# Patient Record
Sex: MALE | Race: WHITE | NOT HISPANIC OR LATINO | Employment: FULL TIME | URBAN - METROPOLITAN AREA
[De-identification: names, ages, dates, MRNs, and addresses within clinical notes are randomized per-mention and may not be internally consistent; named-entity substitution may affect disease eponyms.]

---

## 2022-11-25 ENCOUNTER — APPOINTMENT (OUTPATIENT)
Dept: RADIOLOGY | Facility: MEDICAL CENTER | Age: 57
DRG: 153 | End: 2022-11-25
Payer: COMMERCIAL

## 2022-11-25 ENCOUNTER — APPOINTMENT (OUTPATIENT)
Dept: RADIOLOGY | Facility: MEDICAL CENTER | Age: 57
DRG: 153 | End: 2022-11-25
Attending: STUDENT IN AN ORGANIZED HEALTH CARE EDUCATION/TRAINING PROGRAM
Payer: COMMERCIAL

## 2022-11-25 ENCOUNTER — HOSPITAL ENCOUNTER (INPATIENT)
Facility: MEDICAL CENTER | Age: 57
LOS: 1 days | DRG: 153 | End: 2022-11-26
Attending: STUDENT IN AN ORGANIZED HEALTH CARE EDUCATION/TRAINING PROGRAM | Admitting: STUDENT IN AN ORGANIZED HEALTH CARE EDUCATION/TRAINING PROGRAM
Payer: COMMERCIAL

## 2022-11-25 DIAGNOSIS — I10 PRIMARY HYPERTENSION: ICD-10-CM

## 2022-11-25 DIAGNOSIS — J02.0 STREP PHARYNGITIS: ICD-10-CM

## 2022-11-25 DIAGNOSIS — J98.8 NARROWING OF AIRWAY: ICD-10-CM

## 2022-11-25 DIAGNOSIS — Z72.0 TOBACCO ABUSE: ICD-10-CM

## 2022-11-25 DIAGNOSIS — R09.02 HYPOXEMIA: ICD-10-CM

## 2022-11-25 DIAGNOSIS — G47.33 OSA (OBSTRUCTIVE SLEEP APNEA): ICD-10-CM

## 2022-11-25 PROBLEM — E66.01 MORBID OBESITY (HCC): Status: ACTIVE | Noted: 2022-11-25

## 2022-11-25 PROBLEM — J44.9 COPD (CHRONIC OBSTRUCTIVE PULMONARY DISEASE) (HCC): Status: ACTIVE | Noted: 2022-11-25

## 2022-11-25 PROBLEM — B95.0 GROUP A STREPTOCOCCAL INFECTION: Status: ACTIVE | Noted: 2022-11-25

## 2022-11-25 PROBLEM — J03.90 TONSILLITIS: Status: ACTIVE | Noted: 2022-11-25

## 2022-11-25 LAB
ALBUMIN SERPL BCP-MCNC: 4.3 G/DL (ref 3.2–4.9)
ALBUMIN/GLOB SERPL: 1.4 G/DL
ALP SERPL-CCNC: 67 U/L (ref 30–99)
ALT SERPL-CCNC: 16 U/L (ref 2–50)
ANION GAP SERPL CALC-SCNC: 13 MMOL/L (ref 7–16)
AST SERPL-CCNC: 14 U/L (ref 12–45)
BASOPHILS # BLD AUTO: 0.5 % (ref 0–1.8)
BASOPHILS # BLD: 0.1 K/UL (ref 0–0.12)
BILIRUB SERPL-MCNC: 0.7 MG/DL (ref 0.1–1.5)
BUN SERPL-MCNC: 17 MG/DL (ref 8–22)
CALCIUM SERPL-MCNC: 9 MG/DL (ref 8.5–10.5)
CHLORIDE SERPL-SCNC: 101 MMOL/L (ref 96–112)
CO2 SERPL-SCNC: 23 MMOL/L (ref 20–33)
CREAT SERPL-MCNC: 0.85 MG/DL (ref 0.5–1.4)
EKG IMPRESSION: NORMAL
EOSINOPHIL # BLD AUTO: 0.05 K/UL (ref 0–0.51)
EOSINOPHIL NFR BLD: 0.3 % (ref 0–6.9)
ERYTHROCYTE [DISTWIDTH] IN BLOOD BY AUTOMATED COUNT: 44.1 FL (ref 35.9–50)
FLUAV RNA SPEC QL NAA+PROBE: NEGATIVE
FLUBV RNA SPEC QL NAA+PROBE: NEGATIVE
GFR SERPLBLD CREATININE-BSD FMLA CKD-EPI: 100 ML/MIN/1.73 M 2
GLOBULIN SER CALC-MCNC: 3.1 G/DL (ref 1.9–3.5)
GLUCOSE SERPL-MCNC: 152 MG/DL (ref 65–99)
HCT VFR BLD AUTO: 44.8 % (ref 42–52)
HGB BLD-MCNC: 14.7 G/DL (ref 14–18)
IMM GRANULOCYTES # BLD AUTO: 0.12 K/UL (ref 0–0.11)
IMM GRANULOCYTES NFR BLD AUTO: 0.6 % (ref 0–0.9)
LYMPHOCYTES # BLD AUTO: 1.2 K/UL (ref 1–4.8)
LYMPHOCYTES NFR BLD: 6.4 % (ref 22–41)
MCH RBC QN AUTO: 30.5 PG (ref 27–33)
MCHC RBC AUTO-ENTMCNC: 32.8 G/DL (ref 33.7–35.3)
MCV RBC AUTO: 92.9 FL (ref 81.4–97.8)
MONOCYTES # BLD AUTO: 1.4 K/UL (ref 0–0.85)
MONOCYTES NFR BLD AUTO: 7.4 % (ref 0–13.4)
NEUTROPHILS # BLD AUTO: 15.98 K/UL (ref 1.82–7.42)
NEUTROPHILS NFR BLD: 84.8 % (ref 44–72)
NRBC # BLD AUTO: 0 K/UL
NRBC BLD-RTO: 0 /100 WBC
PLATELET # BLD AUTO: 269 K/UL (ref 164–446)
PMV BLD AUTO: 10.4 FL (ref 9–12.9)
POTASSIUM SERPL-SCNC: 3.8 MMOL/L (ref 3.6–5.5)
PROT SERPL-MCNC: 7.4 G/DL (ref 6–8.2)
RBC # BLD AUTO: 4.82 M/UL (ref 4.7–6.1)
RSV RNA SPEC QL NAA+PROBE: NEGATIVE
S PYO DNA SPEC NAA+PROBE: DETECTED
SARS-COV-2 RNA RESP QL NAA+PROBE: NOTDETECTED
SODIUM SERPL-SCNC: 137 MMOL/L (ref 135–145)
SPECIMEN SOURCE: NORMAL
WBC # BLD AUTO: 18.9 K/UL (ref 4.8–10.8)

## 2022-11-25 PROCEDURE — 700117 HCHG RX CONTRAST REV CODE 255: Performed by: STUDENT IN AN ORGANIZED HEALTH CARE EDUCATION/TRAINING PROGRAM

## 2022-11-25 PROCEDURE — 85025 COMPLETE CBC W/AUTO DIFF WBC: CPT

## 2022-11-25 PROCEDURE — 93005 ELECTROCARDIOGRAM TRACING: CPT

## 2022-11-25 PROCEDURE — A9270 NON-COVERED ITEM OR SERVICE: HCPCS | Performed by: STUDENT IN AN ORGANIZED HEALTH CARE EDUCATION/TRAINING PROGRAM

## 2022-11-25 PROCEDURE — 94660 CPAP INITIATION&MGMT: CPT

## 2022-11-25 PROCEDURE — 770000 HCHG ROOM/CARE - INTERMEDIATE ICU *

## 2022-11-25 PROCEDURE — 96365 THER/PROPH/DIAG IV INF INIT: CPT

## 2022-11-25 PROCEDURE — 700105 HCHG RX REV CODE 258: Performed by: STUDENT IN AN ORGANIZED HEALTH CARE EDUCATION/TRAINING PROGRAM

## 2022-11-25 PROCEDURE — 70491 CT SOFT TISSUE NECK W/DYE: CPT

## 2022-11-25 PROCEDURE — 93005 ELECTROCARDIOGRAM TRACING: CPT | Performed by: STUDENT IN AN ORGANIZED HEALTH CARE EDUCATION/TRAINING PROGRAM

## 2022-11-25 PROCEDURE — 99222 1ST HOSP IP/OBS MODERATE 55: CPT | Performed by: STUDENT IN AN ORGANIZED HEALTH CARE EDUCATION/TRAINING PROGRAM

## 2022-11-25 PROCEDURE — 700111 HCHG RX REV CODE 636 W/ 250 OVERRIDE (IP): Performed by: STUDENT IN AN ORGANIZED HEALTH CARE EDUCATION/TRAINING PROGRAM

## 2022-11-25 PROCEDURE — C9803 HOPD COVID-19 SPEC COLLECT: HCPCS | Performed by: STUDENT IN AN ORGANIZED HEALTH CARE EDUCATION/TRAINING PROGRAM

## 2022-11-25 PROCEDURE — 0241U HCHG SARS-COV-2 COVID-19 NFCT DS RESP RNA 4 TRGT MIC: CPT

## 2022-11-25 PROCEDURE — 92610 EVALUATE SWALLOWING FUNCTION: CPT

## 2022-11-25 PROCEDURE — 700102 HCHG RX REV CODE 250 W/ 637 OVERRIDE(OP): Performed by: INTERNAL MEDICINE

## 2022-11-25 PROCEDURE — 700102 HCHG RX REV CODE 250 W/ 637 OVERRIDE(OP): Performed by: STUDENT IN AN ORGANIZED HEALTH CARE EDUCATION/TRAINING PROGRAM

## 2022-11-25 PROCEDURE — 700101 HCHG RX REV CODE 250: Performed by: STUDENT IN AN ORGANIZED HEALTH CARE EDUCATION/TRAINING PROGRAM

## 2022-11-25 PROCEDURE — 80053 COMPREHEN METABOLIC PANEL: CPT

## 2022-11-25 PROCEDURE — 94640 AIRWAY INHALATION TREATMENT: CPT

## 2022-11-25 PROCEDURE — 36415 COLL VENOUS BLD VENIPUNCTURE: CPT

## 2022-11-25 PROCEDURE — 96375 TX/PRO/DX INJ NEW DRUG ADDON: CPT

## 2022-11-25 PROCEDURE — A9270 NON-COVERED ITEM OR SERVICE: HCPCS | Performed by: INTERNAL MEDICINE

## 2022-11-25 PROCEDURE — 99285 EMERGENCY DEPT VISIT HI MDM: CPT

## 2022-11-25 PROCEDURE — 71045 X-RAY EXAM CHEST 1 VIEW: CPT

## 2022-11-25 PROCEDURE — 87651 STREP A DNA AMP PROBE: CPT

## 2022-11-25 RX ORDER — DEXAMETHASONE SODIUM PHOSPHATE 4 MG/ML
6 INJECTION, SOLUTION INTRA-ARTICULAR; INTRALESIONAL; INTRAMUSCULAR; INTRAVENOUS; SOFT TISSUE ONCE
Status: COMPLETED | OUTPATIENT
Start: 2022-11-25 | End: 2022-11-25

## 2022-11-25 RX ORDER — NICOTINE 21 MG/24HR
21 PATCH, TRANSDERMAL 24 HOURS TRANSDERMAL
Status: DISCONTINUED | OUTPATIENT
Start: 2022-11-25 | End: 2022-11-26 | Stop reason: HOSPADM

## 2022-11-25 RX ORDER — AZITHROMYCIN 500 MG/5ML
500 INJECTION, POWDER, LYOPHILIZED, FOR SOLUTION INTRAVENOUS ONCE
Status: COMPLETED | OUTPATIENT
Start: 2022-11-25 | End: 2022-11-25

## 2022-11-25 RX ORDER — OXYMETAZOLINE HYDROCHLORIDE 0.05 G/100ML
2 SPRAY NASAL ONCE
Status: COMPLETED | OUTPATIENT
Start: 2022-11-25 | End: 2022-11-25

## 2022-11-25 RX ORDER — AZITHROMYCIN 250 MG/1
500 TABLET, FILM COATED ORAL DAILY
Status: DISCONTINUED | OUTPATIENT
Start: 2022-11-26 | End: 2022-11-26 | Stop reason: HOSPADM

## 2022-11-25 RX ORDER — PNV NO.95/FERROUS FUM/FOLIC AC 28MG-0.8MG
325 TABLET ORAL
COMMUNITY

## 2022-11-25 RX ORDER — ALBUTEROL SULFATE 2.5 MG/3ML
2.5 SOLUTION RESPIRATORY (INHALATION) ONCE
Status: DISCONTINUED | OUTPATIENT
Start: 2022-11-25 | End: 2022-11-25

## 2022-11-25 RX ORDER — VALSARTAN 80 MG/1
80 TABLET ORAL DAILY
Status: DISCONTINUED | OUTPATIENT
Start: 2022-11-25 | End: 2022-11-26 | Stop reason: HOSPADM

## 2022-11-25 RX ORDER — AZITHROMYCIN 500 MG/5ML
500 INJECTION, POWDER, LYOPHILIZED, FOR SOLUTION INTRAVENOUS EVERY 24 HOURS
Status: DISCONTINUED | OUTPATIENT
Start: 2022-11-26 | End: 2022-11-25

## 2022-11-25 RX ORDER — VALSARTAN 80 MG/1
80 TABLET ORAL DAILY
Status: ON HOLD | COMMUNITY
End: 2022-11-26 | Stop reason: SDUPTHER

## 2022-11-25 RX ORDER — ENOXAPARIN SODIUM 100 MG/ML
40 INJECTION SUBCUTANEOUS DAILY
Status: DISCONTINUED | OUTPATIENT
Start: 2022-11-25 | End: 2022-11-26 | Stop reason: HOSPADM

## 2022-11-25 RX ORDER — METHYLPREDNISOLONE SODIUM SUCCINATE 40 MG/ML
40 INJECTION, POWDER, LYOPHILIZED, FOR SOLUTION INTRAMUSCULAR; INTRAVENOUS EVERY 8 HOURS
Status: DISCONTINUED | OUTPATIENT
Start: 2022-11-25 | End: 2022-11-26

## 2022-11-25 RX ORDER — KETOROLAC TROMETHAMINE 30 MG/ML
15 INJECTION, SOLUTION INTRAMUSCULAR; INTRAVENOUS ONCE
Status: COMPLETED | OUTPATIENT
Start: 2022-11-25 | End: 2022-11-25

## 2022-11-25 RX ADMIN — PREDNISONE 50 MG: 10 TABLET ORAL at 01:36

## 2022-11-25 RX ADMIN — NICOTINE TRANSDERMAL SYSTEM 21 MG: 21 PATCH, EXTENDED RELEASE TRANSDERMAL at 15:15

## 2022-11-25 RX ADMIN — VALSARTAN 80 MG: 80 TABLET, FILM COATED ORAL at 17:09

## 2022-11-25 RX ADMIN — ALBUTEROL SULFATE 2.5 MG: 2.5 SOLUTION RESPIRATORY (INHALATION) at 01:36

## 2022-11-25 RX ADMIN — DEXAMETHASONE SODIUM PHOSPHATE 6 MG: 4 INJECTION, SOLUTION INTRA-ARTICULAR; INTRALESIONAL; INTRAMUSCULAR; INTRAVENOUS; SOFT TISSUE at 02:16

## 2022-11-25 RX ADMIN — KETOROLAC TROMETHAMINE 15 MG: 30 INJECTION, SOLUTION INTRAMUSCULAR at 05:23

## 2022-11-25 RX ADMIN — AZITHROMYCIN MONOHYDRATE 500 MG: 500 INJECTION, POWDER, LYOPHILIZED, FOR SOLUTION INTRAVENOUS at 03:26

## 2022-11-25 RX ADMIN — OXYMETAZOLINE HCL 2 SPRAY: 0.05 SPRAY NASAL at 01:00

## 2022-11-25 RX ADMIN — ENOXAPARIN SODIUM 40 MG: 40 INJECTION SUBCUTANEOUS at 05:33

## 2022-11-25 RX ADMIN — METHYLPREDNISOLONE SODIUM SUCCINATE 40 MG: 40 INJECTION, POWDER, FOR SOLUTION INTRAMUSCULAR; INTRAVENOUS at 09:04

## 2022-11-25 RX ADMIN — IOHEXOL 80 ML: 350 INJECTION, SOLUTION INTRAVENOUS at 02:15

## 2022-11-25 RX ADMIN — METHYLPREDNISOLONE SODIUM SUCCINATE 40 MG: 40 INJECTION, POWDER, FOR SOLUTION INTRAMUSCULAR; INTRAVENOUS at 17:09

## 2022-11-25 ASSESSMENT — LIFESTYLE VARIABLES
HOW MANY TIMES IN THE PAST YEAR HAVE YOU HAD 5 OR MORE DRINKS IN A DAY: 12
EVER HAD A DRINK FIRST THING IN THE MORNING TO STEADY YOUR NERVES TO GET RID OF A HANGOVER: NO
EVER FELT BAD OR GUILTY ABOUT YOUR DRINKING: NO
TOTAL SCORE: 0
AVERAGE NUMBER OF DAYS PER WEEK YOU HAVE A DRINK CONTAINING ALCOHOL: 1
DOES PATIENT WANT TO STOP DRINKING: NO
HAVE YOU EVER FELT YOU SHOULD CUT DOWN ON YOUR DRINKING: NO
ALCOHOL_USE: YES
CONSUMPTION TOTAL: POSITIVE
HAVE PEOPLE ANNOYED YOU BY CRITICIZING YOUR DRINKING: NO
ON A TYPICAL DAY WHEN YOU DRINK ALCOHOL HOW MANY DRINKS DO YOU HAVE: 2

## 2022-11-25 ASSESSMENT — PAIN DESCRIPTION - PAIN TYPE
TYPE: ACUTE PAIN

## 2022-11-25 ASSESSMENT — ENCOUNTER SYMPTOMS
HEADACHES: 0
VOMITING: 0
SHORTNESS OF BREATH: 1
NAUSEA: 0
CHILLS: 0
FALLS: 0
FEVER: 1
LOSS OF CONSCIOUSNESS: 0
COUGH: 1
SORE THROAT: 0
BLURRED VISION: 0
ABDOMINAL PAIN: 0
NECK PAIN: 0
DOUBLE VISION: 0
EYES NEGATIVE: 1

## 2022-11-25 ASSESSMENT — PATIENT HEALTH QUESTIONNAIRE - PHQ9
1. LITTLE INTEREST OR PLEASURE IN DOING THINGS: NOT AT ALL
2. FEELING DOWN, DEPRESSED, IRRITABLE, OR HOPELESS: NOT AT ALL
SUM OF ALL RESPONSES TO PHQ9 QUESTIONS 1 AND 2: 0

## 2022-11-25 NOTE — H&P
Hospital Medicine History & Physical Note    Date of Service  11/25/2022    Primary Care Physician  No primary care provider on file.    Consultants  ENT  Intensivist     Code Status  Full Code    Chief Complaint  Chief Complaint   Patient presents with    Shortness of Breath       History of Presenting Illness  Tone Wolf is a 57 y.o. male who presented 11/25/2022 with difficulties breathing, sore throat.     Mildly tachypneic in ED. CT neck shows significant edema and swelling is noted around the airway causing narrowing at level of pharynx. Induration and enhancement noted posterior to pharynx could indicate soft tissue infection. GAS +.      I discussed the plan of care with patient.    Review of Systems  Review of Systems   Constitutional:  Positive for malaise/fatigue.   HENT:  Negative for hearing loss.    Eyes: Negative.    Skin: Negative.    All other systems reviewed and are negative.    Past Medical History  No pertinent medical history     Surgical History  No pertinent     Family History  family history is not on file.   Family history reviewed with patient. There is no family history that is pertinent to the chief complaint.     Social History   reports that he has been smoking cigarettes. He has been smoking an average of .75 packs per day. He has never used smokeless tobacco. He reports current alcohol use. He reports that he does not currently use drugs.    Allergies  Allergies   Allergen Reactions    Penicillins Anaphylaxis     Required epi-pen    Reglan [Metoclopramide Hcl]        Medications  None       Physical Exam  Temp:  [37.3 °C (99.2 °F)] 37.3 °C (99.2 °F)  Pulse:  [95] 95  Resp:  [22-26] 22  BP: (126)/(93) 126/93  SpO2:  [92 %] 92 %  Blood Pressure: (!) 126/93   Temperature: 37.3 °C (99.2 °F)   Pulse: 95   Respiration: (!) 22   Pulse Oximetry: 92 %       Physical Exam  Constitutional:       Appearance: Normal appearance. He is obese.   HENT:      Head: Normocephalic.      Nose: Nose  normal.      Mouth/Throat:      Mouth: Mucous membranes are moist.   Eyes:      Pupils: Pupils are equal, round, and reactive to light.   Neck:      Comments: Bilateral tonsils inflammed, swollen and erythematous  Cardiovascular:      Rate and Rhythm: Normal rate and regular rhythm.   Pulmonary:      Effort: Pulmonary effort is normal.      Breath sounds: Normal breath sounds.   Abdominal:      General: Abdomen is flat. Bowel sounds are normal.      Palpations: Abdomen is soft.   Musculoskeletal:         General: Normal range of motion.      Cervical back: Neck supple.   Skin:     General: Skin is warm.   Neurological:      General: No focal deficit present.      Mental Status: He is alert and oriented to person, place, and time. Mental status is at baseline.   Psychiatric:         Mood and Affect: Mood normal.         Behavior: Behavior normal.         Thought Content: Thought content normal.         Judgment: Judgment normal.       Laboratory:      Recent Labs     11/25/22  0057   SODIUM 137   POTASSIUM 3.8   CHLORIDE 101   CO2 23   GLUCOSE 152*   BUN 17   CREATININE 0.85   CALCIUM 9.0     Recent Labs     11/25/22  0057   ALTSGPT 16   ASTSGOT 14   ALKPHOSPHAT 67   TBILIRUBIN 0.7   GLUCOSE 152*         No results for input(s): NTPROBNP in the last 72 hours.      No results for input(s): TROPONINT in the last 72 hours.    Imaging:  CT-SOFT TISSUE NECK WITH   Final Result      1.  Significant edema and swelling is noted around the airway causing narrowing at the level of the pharynx. Findings are suspicious for tonsillitis.      2.  Additional induration and enhancement is noted posterior to the pharynx which could indicate soft tissue infection. No well-defined fluid collection that would suggest abscess is identified.      3.  Bilateral adenopathy is identified which is likely due to infection.      4.  Mucosal thickening in the right maxillary sinus.      DX-CHEST-PORTABLE (1 VIEW)   Final Result         No acute  cardiac or pulmonary abnormality is identified.          X-Ray:  I have personally reviewed the images and compared with prior images.    Assessment/Plan:  Justification for Admission Status  I anticipate this patient will require at least two midnights for appropriate medical management, necessitating inpatient admission because sore throat       * Group A streptococcal infection  Assessment & Plan  Azithromycin ( has PCN allergy)       COPD (chronic obstructive pulmonary disease) (formerly [facility])  Assessment & Plan  Not in exacerbation at this time  Oxygen prn    Morbid obesity (formerly [facility])  Assessment & Plan  Will need counseling when clinically appropriate   CPAP qhs    Tonsillitis  Assessment & Plan  CT neck show Significant edema and swelling is noted around the airway causing narrowing at the level of the pharynx  Solumedrol   ENT on board, no intervention for now      VTE prophylaxis: enoxaparin ppx

## 2022-11-25 NOTE — THERAPY
Speech Language Pathology   Clinical Swallow Evaluation     Patient Name: Tone Wolf  AGE:  57 y.o., SEX:  male  Medical Record #: [id number]  Today's Date: 11/25/2022     Precautions  Precautions: Fall Risk, Swallow Precautions ( See Comments)    Assessment    HPI:57 y.o. male who presented 11/25/2022 with difficulties breathing, sore throat. PMHx COPD, JETHRO hx CMHx Group A streptococcal infection, COPD, Tonsillitis. No SLP hx at [facility].    CXR 11/25:  No focal infiltrates or consolidations are identified in the lungs.  No pleural fluid collections are identified.    CT-Neck 11/25:  1.  Significant edema and swelling is noted around the airway causing narrowing at the level of the pharynx. Findings are suspicious for tonsillitis.  2.  Additional induration and enhancement is noted posterior to the pharynx which could indicate soft tissue infection. No well-defined fluid collection that would suggest abscess is identified.  3.  Bilateral adenopathy is identified which is likely due to infection.  4.  Mucosal thickening in the right maxillary sinus.      Level of Consciousness: Alert, Awake  Affect/Behavior: Appropriate, Calm, Cooperative  Follows Directives: Yes  Orientation: Oriented x 4  Hearing: Functional hearing  Vision: Functional vision      Prior Living Situation & Level of Function:  Patient lives w/SO. Pt denies any hx of dysphagia.    Oral Mechanism Evaluation  Facial Symmetry: Equal  Facial Sensation: Equal  Labial Observations: WFL  Lingual Observations: Midline  Dentition: Good  Comments:      Voice  Quality: Hoarse  Resonance: WFL  Intensity: Appropriate  Cough: WFL  Comments:      Current Method of Nutrition   NPO until cleared by speech pathology      Subjective  Patient received awake, SO at bedside. Pt cleared by RN for evaluation.       Assessment  Positioning: Phillips's (60-90 degrees)  Bolus Administration: Patient  Oxygen Requirements:  3L oxymask  Factor(s) Affecting Performance:  "None    Swallowing Trials  Ice: WFL  Thin Liquid (TN0): WFL  Liquidised (LQ3): WFL  Soft & Bite-Sized (SB6): WFL  Regular (RG7): WFL    Comments:  Adequate bolus acceptance, containment and transit. Prolonged but functional mastication with regular solids. Timely swallow initiation noted. Trace lingual residue with regular solids, cleared with thin liquid rinse. No cough/throat clearing noted. Vocal quality remained clear. Pt did not c/o difficulty swallowing or globus sensation. No overt s/sx of aspiration appreciated.       Clinical Impressions  Patient presents with functional swallow. Diet modification is not indicated. SLP will continue to follow to ensure diet tolerance.        Recommendations  1.  Regular solids and thin liquids  2.  Instrumentation: None indicated at this time  3.  Swallowing Instructions & Precautions:   Supervision: Independent  Positioning: Fully upright and midline during oral intake, Meals sitting upright in a chair, as tolerated  Medication: Whole with liquid, As tolerated  Strategies: Small bites/sips, Slow rate of intake  Oral Care: BID  4. HOLD PO with any difficulty or change in respiratory status      Plan    Recommend Speech Therapy 3 times per week until therapy goals are met for the following treatments:  Dysphagia Training and Patient / Family / Caregiver Education.    Discharge Recommendations: Anticipate that the patient will have no further speech therapy needs after discharge from the hospital       Objective       11/25/22 0913   Patient / Family Goals   Patient / Family Goal #1 \"Do you have steak?\"   Short Term Goals   Short Term Goal # 1 Patient will consume a diet of regular solids and thin liquids with no overt s/sx of aspiration     "

## 2022-11-25 NOTE — PROGRESS NOTES
Med Rec Complete per patient  Allergies Reviewed with patient  No antibiotics within the last 30 days  Patient's Preferred Pharmacy:  [facility] in [place], [place]    Patient mentioned that he uses an inhaler, but could not recall the name or any of the details of this medication.  The patient's home pharmacy did not have a prescription for an inhaler on file for him.

## 2022-11-25 NOTE — ED TRIAGE NOTES
Tone Wolf  57 y.o.  Chief Complaint   Patient presents with    Shortness of Breath     Ambulatory to [facility] for above, pt reporting increased SOB since traveling to [place] from [place] yesterday. Hx COPD, pt has increased work of breathing, is mouth breathing currently. Pt able to speak in full sentences. A&Ox4, VSS, EKG complete in triage, protocol placed.

## 2022-11-25 NOTE — CONSULTS
Consults    Patient was consulted for admission to telemetry, hospitalist requested ERP to discuss admission to IMC, due to swelling and  concerning for tonsillitis and patient with history of severe JETHRO (status post surgery has not used CPAP since). Patient with group A strep pharyngitis, with painful swallowing.   No change in voice, no trouble swallowing(except pain), no drooling, no stridor.  Respiratory rate in the low 20s satting 95% on 2 L.  Does desat to mid 80s when patient falls asleep, consistent with JETHRO.     CT neck:  1.  Significant edema and swelling is noted around the airway causing narrowing at the level of the pharynx. Findings are suspicious for tonsillitis.     2.  Additional induration and enhancement is noted posterior to the pharynx which could indicate soft tissue infection. No well-defined fluid collection that would suggest abscess is identified.    Upper airway swelling/tonsillitis/strep throat  ENT was contacted no intervention tonight.     Patient given antibiotics and Decadron.   Patient should be on CPAP at night at least until swelling improves.     Patient does have risk for decompensation do feel its appropriate to monitor airway closely in C, if patient begins having signs of worsening upper airway obstruction including drooling or voice changes respiratory distress increased work of breathing or stridor please contact intensivist team immediately for reevaluation and possible upgrade, ENT should also be notified of significant changes.

## 2022-11-25 NOTE — ED NOTES
Patient wheeled to red 2 from the lobby. Patient changed into gown and placed on the monitor. Bed locked and in lowest position. Call light within reach. Wife at bedside.

## 2022-11-25 NOTE — ED PROVIDER NOTES
ED Provider Note    Chief Complaint:   Shortness of breath    HPI:  Tone Wolf is a very pleasant 57-year-old male with past medical history of JETHRO who presents with shortness of breath, runny nose, cough starting yesterday.  Patient also had a fever to 102.5.  Patient reports history of smoking for several decades.  Patient endorses chills.  Patient reports recent travel from [place] and is concerned that the altitude may be affecting him.  Patient reports that he used to be on BiPAP daily but that he discontinued it after getting surgery.  Patient reports some discomfort with coughing.    Review of Systems:  Review of Systems   Constitutional:  Positive for fever. Negative for chills.   HENT:  Positive for congestion. Negative for sore throat.    Eyes:  Negative for blurred vision and double vision.   Respiratory:  Positive for cough and shortness of breath.    Cardiovascular:  Negative for chest pain and leg swelling.   Gastrointestinal:  Negative for abdominal pain, nausea and vomiting.   Genitourinary:  Negative for dysuria and hematuria.   Musculoskeletal:  Negative for falls and neck pain.   Skin:  Negative for itching and rash.   Neurological:  Negative for loss of consciousness and headaches.     Family History:  History reviewed. No pertinent family history.    Past Medical History:       Social History:  Social History     Tobacco Use    Smoking status: Every Day     Packs/day: 0.75     Types: Cigarettes    Smokeless tobacco: Never   Vaping Use    Vaping Use: Never used   Substance and Sexual Activity    Alcohol use: Yes     Comment: on sundays    Drug use: Not Currently    Sexual activity: Not on file       Surgical History:  patient denies any surgical history    Allergies:  Allergies   Allergen Reactions    Penicillins Anaphylaxis     Required epi-pen    Reglan [Metoclopramide Hcl]        Physical Exam:  Vital Signs: BP (!) 126/93   Pulse 95   Temp 37.3 °C (99.2 °F) (Temporal)   Resp (!) 22    "Ht 1.88 m (6' 2\")   Wt 122 kg (270 lb)   SpO2 92%   BMI 34.67 kg/m²   Physical Exam  Vitals and nursing note reviewed.   Constitutional:       Comments: Patient is lying in bed supine, pleasant, conversant, speaking in complete sentences   HENT:      Head: Normocephalic and atraumatic.      Comments: No sublingual elevation, no neck swelling, posterior oropharyngeal erythema and swelling is present     Nose: Congestion and rhinorrhea present.   Eyes:      Extraocular Movements: Extraocular movements intact.      Conjunctiva/sclera: Conjunctivae normal.      Pupils: Pupils are equal, round, and reactive to light.   Cardiovascular:      Pulses: Normal pulses.      Comments: HR 95  Pulmonary:      Effort: Pulmonary effort is normal. No respiratory distress.   Musculoskeletal:         General: No swelling. Normal range of motion.      Cervical back: Normal range of motion. No rigidity.   Skin:     General: Skin is warm and dry.      Capillary Refill: Capillary refill takes less than 2 seconds.   Neurological:      Mental Status: He is alert.       Medical records reviewed for continuity of care.     Results for orders placed or performed during the hospital encounter of 11/25/22   COV-2, FLU A/B, AND RSV BY PCR (2-4 HOURS CEPHEID): Collect NP swab in VTM    Specimen: Respirate   Result Value Ref Range    Influenza virus A RNA Negative Negative    Influenza virus B, PCR Negative Negative    RSV, PCR Negative Negative    SARS-CoV-2 by PCR NotDetected     SARS-CoV-2 Source NP Swab    Comp Metabolic Panel   Result Value Ref Range    Sodium 137 135 - 145 mmol/L    Potassium 3.8 3.6 - 5.5 mmol/L    Chloride 101 96 - 112 mmol/L    Co2 23 20 - 33 mmol/L    Anion Gap 13.0 7.0 - 16.0    Glucose 152 (H) 65 - 99 mg/dL    Bun 17 8 - 22 mg/dL    Creatinine 0.85 0.50 - 1.40 mg/dL    Calcium 9.0 8.5 - 10.5 mg/dL    AST(SGOT) 14 12 - 45 U/L    ALT(SGPT) 16 2 - 50 U/L    Alkaline Phosphatase 67 30 - 99 U/L    Total Bilirubin 0.7 0.1 - " 1.5 mg/dL    Albumin 4.3 3.2 - 4.9 g/dL    Total Protein 7.4 6.0 - 8.2 g/dL    Globulin 3.1 1.9 - 3.5 g/dL    A-G Ratio 1.4 g/dL   Group A Strep by PCR    Specimen: Throat   Result Value Ref Range    Group A Strep by PCR DETECTED (A) Not Detected   ESTIMATED GFR   Result Value Ref Range    GFR (CKD-EPI) 100 >60 mL/min/1.73 m 2   EKG (NOW)   Result Value Ref Range    Report       [facility] Emergency Dept[facility]    Test Date:  2022  Pt Name:    BELLA BEASLEY                Department: ER  MRN:        [id number]                      Room:  Gender:     M                            Technician: [id number]  :        [id number]                   Requested By:ER TRIAGE PROTOCOL  Order #:    [id number]                    Reading MD: Wes Aguilera MD    Measurements  Intervals                                Axis  Rate:       102                          P:          21  IL:         157                          QRS:        -39  QRSD:       61                           T:          60  QT:         419  QTc:        546    Interpretive Statements  Sinus tachycardia  Atrial premature complexes  Left axis deviation  No ST elevations or ST depressions amended to inversions  Electronically Signed On 2022 1:29:46 PST by Wes Aguilera MD         Radiology:  CT-SOFT TISSUE NECK WITH   Final Result      1.  Significant edema and swelling is noted around the airway causing narrowing at the level of the pharynx. Findings are suspicious for tonsillitis.      2.  Additional induration and enhancement is noted posterior to the pharynx which could indicate soft tissue infection. No well-defined fluid collection that would suggest abscess is identified.      3.  Bilateral adenopathy is identified which is likely due to infection.      4.  Mucosal thickening in the right maxillary sinus.      DX-CHEST-PORTABLE (1 VIEW)   Final Result         No acute cardiac or pulmonary abnormality is identified.            MDM:  Respiratory viral panel pending to evaluate for flu, COVID, RSV.  Chest x-ray demonstrates no evidence of acute cardiopulmonary process, pneumonia or otherwise.  Patient with significant congestion, I am concerned about a viral upper respiratory infection, likely RSV.  Albuterol given for reported possible history of COPD, prednisone as well.  Afrin for nasal congestion.  EKG without ischemic changes.  Patient has baseline JETHRO, O2 saturations within normal limits on room air but patient did desaturate significantly when he went to sleep which is consistent with his underlying history of untreated JETHRO.  Disposition pending symptom improvement.    Electronically signed by: Wes Aguilera M.D., 11/25/2022, 1:21 AM    Dr Torres of ENT was consulted regarding the patient's narrow airway, he has been informed should the patient deteriorate and require surgical intervention.  Dr. Skelton has admitted the patient with Dr. Sam to the IMCU.  Although there is concern for airway compromise at this time, patient is currently protecting his airway, tolerating secretions, however he is having some voice change per his wife.  Patient will be observed closely in the IMCU for any deterioration.  Patient desatted down to 78% is been placed on oxygen.    Electronically signed by: Wes Aguilera M.D., 11/25/2022 3:14 AM    Critical Care    I spent 38 minutes of critical care time with this patient. This does not include time spent on separately reported billable procedures.   This includes pulse ox interpretation, medical record review when available, interpretation of labs and imaging, specialist consultation, and hemodynamic monitoring.      Disposition:  [facility]    Final Impression:  1. Narrowing of airway    2. Strep pharyngitis    3. JETHRO (obstructive sleep apnea)    4. Hypoxemia        Electronically signed by: Wes Aguilera M.D., 11/25/2022 3:15 AM

## 2022-11-25 NOTE — ASSESSMENT & PLAN NOTE
CT neck show Significant edema and swelling is noted around the airway causing narrowing at the level of the pharynx  Solumedrol   ENT on board, no intervention for now

## 2022-11-25 NOTE — PROGRESS NOTES
4 Eyes Skin Assessment Completed by JUANI Welsh and JUANI Valerio.    Head WDL  Ears WDL  Nose WDL  Mouth WDL  Neck WDL  Breast/Chest Scab  Shoulder Blades WDL  Spine WDL  (R) Arm/Elbow/Hand Rash  (L) Arm/Elbow/Hand Rash  Abdomen WDL  Groin WDL  Scrotum/Coccyx/Buttocks WDL  (R) Leg Scab  (L) Leg Scab  (R) Heel/Foot/Toe Scab  (L) Heel/Foot/Toe Scab          Devices In Places ECG, Blood Pressure Cuff, Pulse Ox, and Oxy Mask      Interventions In Place Pillows and Low Air Loss Mattress    Possible Skin Injury No    Pictures Uploaded Into Epic No, needs to be completed  Wound Consult Placed N/A  RN Wound Prevention Protocol Ordered No

## 2022-11-26 ENCOUNTER — PHARMACY VISIT (OUTPATIENT)
Dept: PHARMACY | Facility: MEDICAL CENTER | Age: 57
End: 2022-11-26
Payer: COMMERCIAL

## 2022-11-26 VITALS
TEMPERATURE: 98.4 F | HEART RATE: 87 BPM | RESPIRATION RATE: 46 BRPM | WEIGHT: 283.51 LBS | BODY MASS INDEX: 36.39 KG/M2 | SYSTOLIC BLOOD PRESSURE: 129 MMHG | OXYGEN SATURATION: 92 % | DIASTOLIC BLOOD PRESSURE: 77 MMHG | HEIGHT: 74 IN

## 2022-11-26 LAB
ALBUMIN SERPL BCP-MCNC: 3.8 G/DL (ref 3.2–4.9)
ALBUMIN/GLOB SERPL: 1 G/DL
ALP SERPL-CCNC: 68 U/L (ref 30–99)
ALT SERPL-CCNC: 12 U/L (ref 2–50)
ANION GAP SERPL CALC-SCNC: 10 MMOL/L (ref 7–16)
AST SERPL-CCNC: 11 U/L (ref 12–45)
BILIRUB SERPL-MCNC: 0.2 MG/DL (ref 0.1–1.5)
BUN SERPL-MCNC: 19 MG/DL (ref 8–22)
CALCIUM SERPL-MCNC: 9.5 MG/DL (ref 8.5–10.5)
CHLORIDE SERPL-SCNC: 104 MMOL/L (ref 96–112)
CO2 SERPL-SCNC: 24 MMOL/L (ref 20–33)
CREAT SERPL-MCNC: 0.77 MG/DL (ref 0.5–1.4)
ERYTHROCYTE [DISTWIDTH] IN BLOOD BY AUTOMATED COUNT: 46.9 FL (ref 35.9–50)
GFR SERPLBLD CREATININE-BSD FMLA CKD-EPI: 103 ML/MIN/1.73 M 2
GLOBULIN SER CALC-MCNC: 3.7 G/DL (ref 1.9–3.5)
GLUCOSE SERPL-MCNC: 164 MG/DL (ref 65–99)
HCT VFR BLD AUTO: 47.3 % (ref 42–52)
HGB BLD-MCNC: 15.2 G/DL (ref 14–18)
MAGNESIUM SERPL-MCNC: 2.4 MG/DL (ref 1.5–2.5)
MCH RBC QN AUTO: 31 PG (ref 27–33)
MCHC RBC AUTO-ENTMCNC: 32.1 G/DL (ref 33.7–35.3)
MCV RBC AUTO: 96.3 FL (ref 81.4–97.8)
PLATELET # BLD AUTO: 299 K/UL (ref 164–446)
PMV BLD AUTO: 10 FL (ref 9–12.9)
POTASSIUM SERPL-SCNC: 4.4 MMOL/L (ref 3.6–5.5)
PROT SERPL-MCNC: 7.5 G/DL (ref 6–8.2)
RBC # BLD AUTO: 4.91 M/UL (ref 4.7–6.1)
SODIUM SERPL-SCNC: 138 MMOL/L (ref 135–145)
WBC # BLD AUTO: 34.3 K/UL (ref 4.8–10.8)

## 2022-11-26 PROCEDURE — RXMED WILLOW AMBULATORY MEDICATION CHARGE: Performed by: INTERNAL MEDICINE

## 2022-11-26 PROCEDURE — 700102 HCHG RX REV CODE 250 W/ 637 OVERRIDE(OP): Performed by: INTERNAL MEDICINE

## 2022-11-26 PROCEDURE — 83735 ASSAY OF MAGNESIUM: CPT

## 2022-11-26 PROCEDURE — 90686 IIV4 VACC NO PRSV 0.5 ML IM: CPT | Performed by: INTERNAL MEDICINE

## 2022-11-26 PROCEDURE — A9270 NON-COVERED ITEM OR SERVICE: HCPCS | Performed by: INTERNAL MEDICINE

## 2022-11-26 PROCEDURE — 85027 COMPLETE CBC AUTOMATED: CPT

## 2022-11-26 PROCEDURE — 80053 COMPREHEN METABOLIC PANEL: CPT

## 2022-11-26 PROCEDURE — 99239 HOSP IP/OBS DSCHRG MGMT >30: CPT | Performed by: INTERNAL MEDICINE

## 2022-11-26 PROCEDURE — 700111 HCHG RX REV CODE 636 W/ 250 OVERRIDE (IP): Performed by: INTERNAL MEDICINE

## 2022-11-26 PROCEDURE — 3E02340 INTRODUCTION OF INFLUENZA VACCINE INTO MUSCLE, PERCUTANEOUS APPROACH: ICD-10-PCS | Performed by: INTERNAL MEDICINE

## 2022-11-26 PROCEDURE — 700111 HCHG RX REV CODE 636 W/ 250 OVERRIDE (IP): Performed by: STUDENT IN AN ORGANIZED HEALTH CARE EDUCATION/TRAINING PROGRAM

## 2022-11-26 RX ORDER — AZITHROMYCIN 500 MG/1
500 TABLET, FILM COATED ORAL DAILY
Qty: 1 TABLET | Refills: 0 | Status: SHIPPED | OUTPATIENT
Start: 2022-11-27

## 2022-11-26 RX ORDER — PREDNISONE 20 MG/1
20 TABLET ORAL DAILY
Qty: 3 TABLET | Refills: 0 | Status: SHIPPED | OUTPATIENT
Start: 2022-11-26 | End: 2022-11-29

## 2022-11-26 RX ORDER — NICOTINE 21 MG/24HR
1 PATCH, TRANSDERMAL 24 HOURS TRANSDERMAL EVERY 24 HOURS
Qty: 30 PATCH | Refills: 0 | Status: SHIPPED | OUTPATIENT
Start: 2022-11-26

## 2022-11-26 RX ORDER — PREDNISONE 20 MG/1
20 TABLET ORAL DAILY
Status: DISCONTINUED | OUTPATIENT
Start: 2022-11-26 | End: 2022-11-26 | Stop reason: HOSPADM

## 2022-11-26 RX ORDER — VALSARTAN 80 MG/1
80 TABLET ORAL DAILY
Qty: 30 TABLET | Refills: 0 | Status: SHIPPED | OUTPATIENT
Start: 2022-11-26

## 2022-11-26 RX ADMIN — PREDNISONE 20 MG: 20 TABLET ORAL at 09:30

## 2022-11-26 RX ADMIN — NICOTINE TRANSDERMAL SYSTEM 21 MG: 21 PATCH, EXTENDED RELEASE TRANSDERMAL at 06:14

## 2022-11-26 RX ADMIN — METHYLPREDNISOLONE SODIUM SUCCINATE 40 MG: 40 INJECTION, POWDER, FOR SOLUTION INTRAMUSCULAR; INTRAVENOUS at 01:42

## 2022-11-26 RX ADMIN — INFLUENZA A VIRUS A/VICTORIA/2570/2019 IVR-215 (H1N1) ANTIGEN (FORMALDEHYDE INACTIVATED), INFLUENZA A VIRUS A/DARWIN/9/2021 SAN-010 (H3N2) ANTIGEN (FORMALDEHYDE INACTIVATED), INFLUENZA B VIRUS B/PHUKET/3073/2013 ANTIGEN (FORMALDEHYDE INACTIVATED), AND INFLUENZA B VIRUS B/MICHIGAN/01/2021 ANTIGEN (FORMALDEHYDE INACTIVATED) 0.5 ML: 15; 15; 15; 15 INJECTION, SUSPENSION INTRAMUSCULAR at 09:24

## 2022-11-26 RX ADMIN — ENOXAPARIN SODIUM 40 MG: 40 INJECTION SUBCUTANEOUS at 06:14

## 2022-11-26 RX ADMIN — VALSARTAN 80 MG: 80 TABLET, FILM COATED ORAL at 06:14

## 2022-11-26 RX ADMIN — AZITHROMYCIN MONOHYDRATE 500 MG: 250 TABLET ORAL at 06:14

## 2022-11-26 ASSESSMENT — PAIN DESCRIPTION - PAIN TYPE: TYPE: ACUTE PAIN

## 2022-11-26 NOTE — CARE PLAN
The patient is Stable - Low risk of patient condition declining or worsening    Shift Goals  Clinical Goals: stable O2 levels  Patient Goals: eat  Family Goals: [place]    Progress made toward(s) clinical / shift goals:    Problem: Pain - Standard  Goal: Alleviation of pain or a reduction in pain to the patient’s comfort goal  Outcome: Progressing     Problem: Knowledge Deficit - Standard  Goal: Patient and family/care givers will demonstrate understanding of plan of care, disease process/condition, diagnostic tests and medications  Outcome: Progressing

## 2022-11-26 NOTE — DISCHARGE SUMMARY
Discharge Summary    CHIEF COMPLAINT ON ADMISSION  Chief Complaint   Patient presents with    Shortness of Breath       Reason for Admission  Shortness of breath     Admission Date  11/25/2022    CODE STATUS  Prior    HPI & HOSPITAL COURSE    Tone Wolf is a 57 y.o. male who presented 11/25/2022 with difficulties breathing, sore throat.  On presentation he found to have mildly tachypneic in ED. CT neck shows significant edema and swelling is noted around the airway causing narrowing at level of pharynx. Induration and enhancement noted posterior to pharynx could indicate soft tissue infection. GAS +.  ENT surgery consulted no surgical intervention.  Due to penicillin allergy he was started on azithromycin and to decrease swelling he was placed on a steroid.    Today I evaluated and examined him at the bedside.  He expressed that he is significantly feeling better and he is able to tolerate p.o. intake and he does not have any difficulty breathing.  I discussed with him at length that he will need to see a primary care doctor and take his medication as prescribed.  I strongly recommended him for complete smoking cessation and he expressed understanding.  I prescribed him 1 more dose of azithromycin for tomorrow and I also prescribed him a steroid.  He was evaluated for home oxygen he is maintaining oxygen saturation above 90% on room air.  Today on the day of discharge he denies any acute complaints and he feels ready to be discharged.  He will need outpatient evaluation for his CPAP.  He was on CPAP before for his sleep apnea but he stopped using that.    After discussing it with him regarding smoking cessation I prescribed him nicotine patches.  I recommended him not to use both nicotine patches and smoking together.    Today on the day of discharge he denies any acute complaints and he feels ready to be discharged.    Therefore, he is discharged in fair and stable condition to home with close outpatient  follow-up.    The patient recovered much more quickly than anticipated on admission.    Discharge Date  11/26/2022    FOLLOW UP ITEMS POST DISCHARGE  Primary care physician.  Sleep medicine    DISCHARGE DIAGNOSES  Principal Problem:    Group A streptococcal infection POA: Unknown  Active Problems:    Tonsillitis POA: Unknown    Morbid obesity (HCC) POA: Unknown    Strep pharyngitis POA: Yes    COPD (chronic obstructive pulmonary disease) ([facility]) POA: Unknown  Resolved Problems:    * No resolved hospital problems. *      FOLLOW UP    Primary care  Follow up with your primary care provider once you return home within 1 week.  Follow up        MEDICATIONS ON DISCHARGE     Medication List        START taking these medications        Instructions   azithromycin 500 MG tablet  Start taking on: November 27, 2022  Commonly known as: ZITHROMAX   Take 1 Tablet by mouth every day.  Dose: 500 mg     nicotine 21 MG/24HR Pt24  Commonly known as: NICODERM   Place 1 Patch on the skin every 24 hours.  Dose: 1 Patch     predniSONE 20 MG Tabs  Commonly known as: DELTASONE   Take 1 Tablet by mouth every day for 3 days.  Dose: 20 mg            CHANGE how you take these medications        Instructions   valsartan 80 MG Tabs  What changed: additional instructions  Commonly known as: DIOVAN   Take 1 Tablet by mouth every day. Indications: High Blood Pressure Disorder  Dose: 80 mg            CONTINUE taking these medications        Instructions   Iron 325 (65 Fe) MG Tabs   Take 325 mg by mouth every day.  Dose: 325 mg              Allergies  Allergies   Allergen Reactions    Penicillins Anaphylaxis     Required epi-pen    Reglan [Metoclopramide Hcl] Unspecified     Becomes aggressive        DIET  No orders of the defined types were placed in this encounter.      ACTIVITY  As tolerated.  Weight bearing as tolerated    CONSULTATIONS  None    PROCEDURES  None    LABORATORY  Lab Results   Component Value Date    SODIUM 138 11/26/2022    POTASSIUM  4.4 11/26/2022    CHLORIDE 104 11/26/2022    CO2 24 11/26/2022    GLUCOSE 164 (H) 11/26/2022    BUN 19 11/26/2022    CREATININE 0.77 11/26/2022        Lab Results   Component Value Date    WBC 34.3 (H) 11/26/2022    HEMOGLOBIN 15.2 11/26/2022    HEMATOCRIT 47.3 11/26/2022    PLATELETCT 299 11/26/2022      CT-SOFT TISSUE NECK WITH   Final Result      1.  Significant edema and swelling is noted around the airway causing narrowing at the level of the pharynx. Findings are suspicious for tonsillitis.      2.  Additional induration and enhancement is noted posterior to the pharynx which could indicate soft tissue infection. No well-defined fluid collection that would suggest abscess is identified.      3.  Bilateral adenopathy is identified which is likely due to infection.      4.  Mucosal thickening in the right maxillary sinus.      DX-CHEST-PORTABLE (1 VIEW)   Final Result         No acute cardiac or pulmonary abnormality is identified.            Total time of the discharge process exceeds 33 minutes.

## 2022-11-26 NOTE — DISCHARGE INSTRUCTIONS
11/26/22    DISCHARGE INSTRUCTIONS PER Sabas Chase M.D.    Diagnosis: Group A streptococcus infection.    I prescribed antibiotic please take 1 more dose tomorrow.  Please follow-up with primary care provider.  I also gave you prescription for your blood pressure medications as per your request please take medication as prescribed.  I prescribed you nicotine patch please do not use it while smoking otherwise you will get double the dose of nicotine.  For any medical emergency please go to the nearest emergency room or call 911.      Resume regular activity. Resume regular diet. Take medications as prescribed. Follow up with your regular doctor within 1 week of discharge.

## 2022-11-26 NOTE — PROGRESS NOTES
Hospital Medicine Daily Progress Note    Date of Service  11/25/2022    Chief Complaint  Tone Wolf is a 57 y.o. male admitted 11/25/2022 with shortness of breath    Hospital Course  Tone Wolf is a 57 y.o. male who presented 11/25/2022 with difficulties breathing, sore throat.      Mildly tachypneic in ED. CT neck shows significant edema and swelling is noted around the airway causing narrowing at level of pharynx. Induration and enhancement noted posterior to pharynx could indicate soft tissue infection. GAS +.  Due to penicillin allergy he was started on azithromycin.  Interval Problem Update    11/25/22    I evaluated and examined him at the bedside.  He reported that he is feeling better but  Discussed plan of care with patient and significant other at the bedside.  Plan is to continue azithromycin and a steroid.  ENT was consulted by ER physician no surgical intervention.    I have discussed this patient's plan of care and discharge plan at IDT rounds today with Case Management, Nursing, Nursing leadership, and other members of the IDT team.    Consultants/Specialty  ENT    Code Status  Full Code    Disposition  Patient is not medically cleared for discharge.   Anticipate discharge to to home with close outpatient follow-up.  I have placed the appropriate orders for post-discharge needs.    Review of Systems  ROS     Physical Exam  Temp:  [36.6 °C (97.8 °F)-38.1 °C (100.5 °F)] 36.6 °C (97.8 °F)  Pulse:  [] 103  Resp:  [19-46] 46  BP: (126-155)/(70-95) 145/76  SpO2:  [84 %-96 %] 96 %    Physical Exam    Fluids  No intake or output data in the 24 hours ending 11/25/22 1629    Laboratory  Recent Labs     11/25/22  0057   WBC 18.9*   RBC 4.82   HEMOGLOBIN 14.7   HEMATOCRIT 44.8   MCV 92.9   MCH 30.5   MCHC 32.8*   RDW 44.1   PLATELETCT 269   MPV 10.4     Recent Labs     11/25/22 0057   SODIUM 137   POTASSIUM 3.8   CHLORIDE 101   CO2 23   GLUCOSE 152*   BUN 17   CREATININE 0.85   CALCIUM 9.0                    Imaging  CT-SOFT TISSUE NECK WITH   Final Result      1.  Significant edema and swelling is noted around the airway causing narrowing at the level of the pharynx. Findings are suspicious for tonsillitis.      2.  Additional induration and enhancement is noted posterior to the pharynx which could indicate soft tissue infection. No well-defined fluid collection that would suggest abscess is identified.      3.  Bilateral adenopathy is identified which is likely due to infection.      4.  Mucosal thickening in the right maxillary sinus.      DX-CHEST-PORTABLE (1 VIEW)   Final Result         No acute cardiac or pulmonary abnormality is identified.           Assessment/Plan  * Group A streptococcal infection  Assessment & Plan  Azithromycin ( has PCN allergy)       COPD (chronic obstructive pulmonary disease) ([facility])  Assessment & Plan  Not in exacerbation at this time  Oxygen prn    Morbid obesity ([facility])  Assessment & Plan  Will need counseling when clinically appropriate   CPAP qhs    Tonsillitis  Assessment & Plan  CT neck show Significant edema and swelling is noted around the airway causing narrowing at the level of the pharynx  Solumedrol   ENT on board, no intervention for now       I resume his outpatient blood pressure medication.  Continue to monitor closely.          VTE prophylaxis: enoxaparin ppx    I have performed a physical exam and reviewed and updated ROS and Plan today (11/25/2022). In review of yesterday's note (11/24/2022), there are no changes except as documented above.         18